# Patient Record
Sex: MALE | Race: WHITE | NOT HISPANIC OR LATINO | Employment: OTHER | URBAN - METROPOLITAN AREA
[De-identification: names, ages, dates, MRNs, and addresses within clinical notes are randomized per-mention and may not be internally consistent; named-entity substitution may affect disease eponyms.]

---

## 2023-04-25 DIAGNOSIS — I10 PRIMARY HYPERTENSION: Primary | ICD-10-CM

## 2023-04-25 RX ORDER — HYDROCHLOROTHIAZIDE 12.5 MG/1
12.5 CAPSULE ORAL DAILY
Qty: 90 CAPSULE | Refills: 3 | Status: SHIPPED | OUTPATIENT
Start: 2023-04-25 | End: 2024-04-25 | Stop reason: SDUPTHER

## 2023-04-25 RX ORDER — HYDROCHLOROTHIAZIDE 12.5 MG/1
1 CAPSULE ORAL DAILY
COMMUNITY
Start: 2013-08-25 | End: 2023-04-25 | Stop reason: SDUPTHER

## 2023-04-25 RX ORDER — HYDROCHLOROTHIAZIDE 12.5 MG/1
CAPSULE ORAL
Qty: 90 CAPSULE | Refills: 2 | Status: SHIPPED
Start: 2023-04-25 | End: 2023-09-12 | Stop reason: ALTCHOICE

## 2023-07-25 DIAGNOSIS — I10 PRIMARY HYPERTENSION: Primary | ICD-10-CM

## 2023-07-25 RX ORDER — AMLODIPINE BESYLATE 5 MG/1
TABLET ORAL
COMMUNITY
Start: 2023-06-25 | End: 2023-07-25 | Stop reason: SDUPTHER

## 2023-07-25 RX ORDER — AMLODIPINE BESYLATE 5 MG/1
TABLET ORAL
Qty: 90 TABLET | Refills: 3 | Status: SHIPPED | OUTPATIENT
Start: 2023-07-25

## 2023-09-12 ENCOUNTER — OFFICE VISIT (OUTPATIENT)
Dept: PRIMARY CARE | Facility: CLINIC | Age: 73
End: 2023-09-12
Payer: MEDICARE

## 2023-09-12 VITALS
SYSTOLIC BLOOD PRESSURE: 126 MMHG | HEART RATE: 67 BPM | WEIGHT: 198 LBS | DIASTOLIC BLOOD PRESSURE: 74 MMHG | BODY MASS INDEX: 30.01 KG/M2 | TEMPERATURE: 97.1 F | HEIGHT: 68 IN

## 2023-09-12 DIAGNOSIS — K76.0 FATTY INFILTRATION OF LIVER: ICD-10-CM

## 2023-09-12 DIAGNOSIS — E78.2 MIXED HYPERLIPIDEMIA: ICD-10-CM

## 2023-09-12 DIAGNOSIS — Z00.00 ROUTINE GENERAL MEDICAL EXAMINATION AT HEALTH CARE FACILITY: Primary | ICD-10-CM

## 2023-09-12 DIAGNOSIS — I10 ESSENTIAL HYPERTENSION: ICD-10-CM

## 2023-09-12 PROBLEM — N40.0 BENIGN PROSTATIC HYPERPLASIA WITHOUT LOWER URINARY TRACT SYMPTOMS: Status: ACTIVE | Noted: 2023-09-12

## 2023-09-12 PROBLEM — I73.00 RAYNAUD'S DISEASE: Status: ACTIVE | Noted: 2023-09-12

## 2023-09-12 PROBLEM — E78.5 HYPERLIPIDEMIA: Status: ACTIVE | Noted: 2023-09-12

## 2023-09-12 PROBLEM — L30.0 NUMMULAR DERMATITIS: Status: RESOLVED | Noted: 2019-11-26 | Resolved: 2023-09-12

## 2023-09-12 PROBLEM — Z86.0101 HX OF ADENOMATOUS COLONIC POLYPS: Status: RESOLVED | Noted: 2018-01-01 | Resolved: 2023-09-12

## 2023-09-12 PROBLEM — I87.2 CHRONIC VENOUS INSUFFICIENCY: Status: RESOLVED | Noted: 2019-11-26 | Resolved: 2023-09-12

## 2023-09-12 PROBLEM — K57.90 DIVERTICULOSIS: Status: RESOLVED | Noted: 2023-09-12 | Resolved: 2023-09-12

## 2023-09-12 PROBLEM — E55.9 VITAMIN D DEFICIENCY: Status: ACTIVE | Noted: 2023-09-12

## 2023-09-12 PROBLEM — Z86.010 HX OF ADENOMATOUS COLONIC POLYPS: Status: RESOLVED | Noted: 2018-01-01 | Resolved: 2023-09-12

## 2023-09-12 PROBLEM — H90.3 BILATERAL SENSORINEURAL HEARING LOSS: Status: RESOLVED | Noted: 2023-09-12 | Resolved: 2023-09-12

## 2023-09-12 LAB
ALANINE AMINOTRANSFERASE (SGPT) (U/L) IN SER/PLAS: 20 U/L (ref 10–52)
ALBUMIN (G/DL) IN SER/PLAS: 4.6 G/DL (ref 3.4–5)
ALKALINE PHOSPHATASE (U/L) IN SER/PLAS: 67 U/L (ref 33–136)
ANION GAP IN SER/PLAS: 15 MMOL/L (ref 10–20)
ASPARTATE AMINOTRANSFERASE (SGOT) (U/L) IN SER/PLAS: 18 U/L (ref 9–39)
BILIRUBIN TOTAL (MG/DL) IN SER/PLAS: 1.6 MG/DL (ref 0–1.2)
CALCIUM (MG/DL) IN SER/PLAS: 9.6 MG/DL (ref 8.6–10.6)
CARBON DIOXIDE, TOTAL (MMOL/L) IN SER/PLAS: 26 MMOL/L (ref 21–32)
CHLORIDE (MMOL/L) IN SER/PLAS: 103 MMOL/L (ref 98–107)
CHOLESTEROL (MG/DL) IN SER/PLAS: 108 MG/DL (ref 0–199)
CHOLESTEROL IN HDL (MG/DL) IN SER/PLAS: 44.2 MG/DL
CHOLESTEROL/HDL RATIO: 2.4
CREAT UR STRIP-MCNC: 100 MG/DL
CREATININE (MG/DL) IN SER/PLAS: 0.99 MG/DL (ref 0.5–1.3)
ERYTHROCYTE DISTRIBUTION WIDTH (RATIO) BY AUTOMATED COUNT: 13.2 % (ref 11.5–14.5)
ERYTHROCYTE MEAN CORPUSCULAR HEMOGLOBIN CONCENTRATION (G/DL) BY AUTOMATED: 33 G/DL (ref 32–36)
ERYTHROCYTE MEAN CORPUSCULAR VOLUME (FL) BY AUTOMATED COUNT: 89 FL (ref 80–100)
ERYTHROCYTES (10*6/UL) IN BLOOD BY AUTOMATED COUNT: 5.57 X10E12/L (ref 4.5–5.9)
GFR MALE: 80 ML/MIN/1.73M2
GLUCOSE (MG/DL) IN SER/PLAS: 92 MG/DL (ref 74–99)
HEMATOCRIT (%) IN BLOOD BY AUTOMATED COUNT: 49.7 % (ref 41–52)
HEMOGLOBIN (G/DL) IN BLOOD: 16.4 G/DL (ref 13.5–17.5)
LDL: 48 MG/DL (ref 0–99)
LEUKOCYTES (10*3/UL) IN BLOOD BY AUTOMATED COUNT: 6 X10E9/L (ref 4.4–11.3)
MICROALBUMIN UR TEST STR-MCNC: 10 MG/L
NRBC (PER 100 WBCS) BY AUTOMATED COUNT: 0 /100 WBC (ref 0–0)
PLATELETS (10*3/UL) IN BLOOD AUTOMATED COUNT: 179 X10E9/L (ref 150–450)
POTASSIUM (MMOL/L) IN SER/PLAS: 3.9 MMOL/L (ref 3.5–5.3)
PROT/CREAT UR: <30 UG/MG CREAT
PROTEIN TOTAL: 7.1 G/DL (ref 6.4–8.2)
SODIUM (MMOL/L) IN SER/PLAS: 140 MMOL/L (ref 136–145)
TRIGLYCERIDE (MG/DL) IN SER/PLAS: 79 MG/DL (ref 0–149)
UREA NITROGEN (MG/DL) IN SER/PLAS: 16 MG/DL (ref 6–23)
VLDL: 16 MG/DL (ref 0–40)

## 2023-09-12 PROCEDURE — 1036F TOBACCO NON-USER: CPT | Performed by: INTERNAL MEDICINE

## 2023-09-12 PROCEDURE — 82043 UR ALBUMIN QUANTITATIVE: CPT | Mod: CLIA WAIVED TEST | Performed by: INTERNAL MEDICINE

## 2023-09-12 PROCEDURE — 3074F SYST BP LT 130 MM HG: CPT | Performed by: INTERNAL MEDICINE

## 2023-09-12 PROCEDURE — 82570 ASSAY OF URINE CREATININE: CPT | Mod: CLIA WAIVED TEST | Performed by: INTERNAL MEDICINE

## 2023-09-12 PROCEDURE — 80061 LIPID PANEL: CPT

## 2023-09-12 PROCEDURE — 1160F RVW MEDS BY RX/DR IN RCRD: CPT | Performed by: INTERNAL MEDICINE

## 2023-09-12 PROCEDURE — 1159F MED LIST DOCD IN RCRD: CPT | Performed by: INTERNAL MEDICINE

## 2023-09-12 PROCEDURE — G0439 PPPS, SUBSEQ VISIT: HCPCS | Performed by: INTERNAL MEDICINE

## 2023-09-12 PROCEDURE — 99214 OFFICE O/P EST MOD 30 MIN: CPT | Performed by: INTERNAL MEDICINE

## 2023-09-12 PROCEDURE — 3078F DIAST BP <80 MM HG: CPT | Performed by: INTERNAL MEDICINE

## 2023-09-12 PROCEDURE — 85027 COMPLETE CBC AUTOMATED: CPT

## 2023-09-12 PROCEDURE — G0444 DEPRESSION SCREEN ANNUAL: HCPCS | Performed by: INTERNAL MEDICINE

## 2023-09-12 PROCEDURE — 80053 COMPREHEN METABOLIC PANEL: CPT

## 2023-09-12 PROCEDURE — 1170F FXNL STATUS ASSESSED: CPT | Performed by: INTERNAL MEDICINE

## 2023-09-12 RX ORDER — GLUCOSAMINE/CHONDR SU A SOD 750-600 MG
TABLET ORAL
COMMUNITY

## 2023-09-12 RX ORDER — ATORVASTATIN CALCIUM 10 MG/1
1 TABLET, FILM COATED ORAL DAILY
COMMUNITY
Start: 2013-07-25 | End: 2024-01-22 | Stop reason: SDUPTHER

## 2023-09-12 RX ORDER — ACETAMINOPHEN 500 MG
1 TABLET ORAL DAILY
COMMUNITY
Start: 2013-08-11

## 2023-09-12 RX ORDER — NAPROXEN SODIUM 220 MG/1
TABLET ORAL
COMMUNITY

## 2023-09-12 ASSESSMENT — ACTIVITIES OF DAILY LIVING (ADL)
MANAGING_FINANCES: INDEPENDENT
TAKING_MEDICATION: INDEPENDENT
DOING_HOUSEWORK: INDEPENDENT
GROCERY_SHOPPING: INDEPENDENT
DRESSING: INDEPENDENT
BATHING: INDEPENDENT

## 2023-09-12 ASSESSMENT — PATIENT HEALTH QUESTIONNAIRE - PHQ9
1. LITTLE INTEREST OR PLEASURE IN DOING THINGS: NOT AT ALL
2. FEELING DOWN, DEPRESSED OR HOPELESS: NOT AT ALL
SUM OF ALL RESPONSES TO PHQ9 QUESTIONS 1 AND 2: 0

## 2023-09-12 NOTE — PROGRESS NOTES
"Subjective   Wicho Alexis is a 73 y.o. male who presents for No chief complaint on file..  HPI    Interim:  - concerned about weight gain, about 8lbs    No CP or SOB.    Some sinus congestion with PND.     Occasional nocturia or incomplete void, not troubling, no changes.   Not sexually active.     , non-smoker, no EtOH use, well-balanced diet.  Walking at least a mile daily.   Does some part-time work on the side, mostly repair work.     Sees a chiropractor, managing back pain/arthritis.   Has seen Dr. Nicole in the past for rash/itching.             Objective   /82   Pulse 67   Temp 36.2 °C (97.1 °F)   Ht 1.717 m (5' 7.58\")   Wt 89.8 kg (198 lb)   BMI 30.48 kg/m²    Physical Exam  Gen: NAD, pleasant, A&;Ox3  HEENT: PERRL, EOMI, MMM, OP clear  Neck: supple, no thyromegaly, no JVD, normal carotid upstroke  Pulm: lungs CTAB, good air movement  CV: RRR, no m/r/g, 2+ DP pulses on the left, 1+ on the right  Abd: NABS, soft, NT, ND no HSM  Ext: no peripheral edema, some swelling and CVI changes  Neuro: CN II-XII intact, no focal sensory or motor deficits, normal reflexes  Assessment/Plan     Hypertension, usually good control; continue home monitoring  -Continue amlodipine 5 and HCTZ 12.5  - On statin for primary prevention, off ASA as per ASPREE; LDL optimized  - Recheck basic labs     Health maintenance  -Last colonoscopy: 2/2/2023, mild diverticulosis and internal hemorrhoids; repeat 5 years  - PSA 0.8, SEP 2019, mild BPH sx's  -Smoking history: Never  -Counseled regarding diet and exercise  -Immunizations: annual flu, otherwise UTD  -Followup annually for Medicare wellness visits and as needed  Problem List Items Addressed This Visit    None           Scot Mistry MD   "

## 2024-01-22 DIAGNOSIS — E78.2 MIXED HYPERLIPIDEMIA: Primary | ICD-10-CM

## 2024-01-22 RX ORDER — ATORVASTATIN CALCIUM 10 MG/1
10 TABLET, FILM COATED ORAL DAILY
Qty: 90 TABLET | Refills: 3 | Status: SHIPPED | OUTPATIENT
Start: 2024-01-22

## 2024-04-23 DIAGNOSIS — I10 PRIMARY HYPERTENSION: ICD-10-CM

## 2024-04-24 DIAGNOSIS — I10 PRIMARY HYPERTENSION: ICD-10-CM

## 2024-04-25 RX ORDER — HYDROCHLOROTHIAZIDE 12.5 MG/1
12.5 CAPSULE ORAL DAILY
Qty: 90 CAPSULE | Refills: 3 | Status: SHIPPED | OUTPATIENT
Start: 2024-04-25

## 2024-07-02 ASSESSMENT — ENCOUNTER SYMPTOMS
COUGH: 1
FEVER: 1

## 2024-07-03 ENCOUNTER — HOSPITAL ENCOUNTER (OUTPATIENT)
Dept: RADIOLOGY | Facility: CLINIC | Age: 74
Discharge: HOME | End: 2024-07-03
Payer: MEDICARE

## 2024-07-03 ENCOUNTER — OFFICE VISIT (OUTPATIENT)
Dept: PRIMARY CARE | Facility: CLINIC | Age: 74
End: 2024-07-03
Payer: MEDICARE

## 2024-07-03 VITALS
WEIGHT: 198 LBS | DIASTOLIC BLOOD PRESSURE: 75 MMHG | OXYGEN SATURATION: 93 % | SYSTOLIC BLOOD PRESSURE: 116 MMHG | BODY MASS INDEX: 31.08 KG/M2 | HEART RATE: 80 BPM | HEIGHT: 67 IN | TEMPERATURE: 99.9 F

## 2024-07-03 DIAGNOSIS — R50.9 FEVER, UNSPECIFIED FEVER CAUSE: ICD-10-CM

## 2024-07-03 DIAGNOSIS — R30.0 DYSURIA: Primary | ICD-10-CM

## 2024-07-03 DIAGNOSIS — J18.9 COMMUNITY ACQUIRED PNEUMONIA OF LEFT LOWER LOBE OF LUNG: ICD-10-CM

## 2024-07-03 DIAGNOSIS — J45.991 COUGH VARIANT ASTHMA (HHS-HCC): ICD-10-CM

## 2024-07-03 LAB
APPEARANCE UR: CLEAR
BILIRUB UR QL STRIP: NEGATIVE
COLOR UR: ABNORMAL
GLUCOSE UR STRIP-MCNC: NEGATIVE MG/DL
HGB UR QL STRIP: ABNORMAL
KETONES UR STRIP-MCNC: ABNORMAL MG/DL
LEUKOCYTE ESTERASE UR QL STRIP: NEGATIVE
NITRITE UR QL STRIP: NEGATIVE
PH UR STRIP: 6 [PH]
PROT UR STRIP-MCNC: ABNORMAL MG/DL
SP GR UR STRIP.AUTO: 1.02
UROBILINOGEN UR STRIP-ACNC: 0.2 E.U./DL

## 2024-07-03 PROCEDURE — 1123F ACP DISCUSS/DSCN MKR DOCD: CPT | Performed by: INTERNAL MEDICINE

## 2024-07-03 PROCEDURE — 71046 X-RAY EXAM CHEST 2 VIEWS: CPT | Performed by: RADIOLOGY

## 2024-07-03 PROCEDURE — 99214 OFFICE O/P EST MOD 30 MIN: CPT | Performed by: INTERNAL MEDICINE

## 2024-07-03 PROCEDURE — 1159F MED LIST DOCD IN RCRD: CPT | Performed by: INTERNAL MEDICINE

## 2024-07-03 PROCEDURE — 87632 RESP VIRUS 6-11 TARGETS: CPT

## 2024-07-03 PROCEDURE — 81003 URINALYSIS AUTO W/O SCOPE: CPT | Performed by: INTERNAL MEDICINE

## 2024-07-03 PROCEDURE — 1036F TOBACCO NON-USER: CPT | Performed by: INTERNAL MEDICINE

## 2024-07-03 PROCEDURE — 3074F SYST BP LT 130 MM HG: CPT | Performed by: INTERNAL MEDICINE

## 2024-07-03 PROCEDURE — 71046 X-RAY EXAM CHEST 2 VIEWS: CPT

## 2024-07-03 PROCEDURE — 3078F DIAST BP <80 MM HG: CPT | Performed by: INTERNAL MEDICINE

## 2024-07-03 RX ORDER — LEVOFLOXACIN 750 MG/1
750 TABLET ORAL DAILY
Qty: 5 TABLET | Refills: 0 | Status: SHIPPED | OUTPATIENT
Start: 2024-07-03 | End: 2024-07-08

## 2024-07-03 ASSESSMENT — COLUMBIA-SUICIDE SEVERITY RATING SCALE - C-SSRS
6. HAVE YOU EVER DONE ANYTHING, STARTED TO DO ANYTHING, OR PREPARED TO DO ANYTHING TO END YOUR LIFE?: NO
2. HAVE YOU ACTUALLY HAD ANY THOUGHTS OF KILLING YOURSELF?: NO
1. IN THE PAST MONTH, HAVE YOU WISHED YOU WERE DEAD OR WISHED YOU COULD GO TO SLEEP AND NOT WAKE UP?: NO

## 2024-07-03 ASSESSMENT — ENCOUNTER SYMPTOMS
LOSS OF SENSATION IN FEET: 0
OCCASIONAL FEELINGS OF UNSTEADINESS: 0
DEPRESSION: 0

## 2024-07-03 ASSESSMENT — PATIENT HEALTH QUESTIONNAIRE - PHQ9
SUM OF ALL RESPONSES TO PHQ9 QUESTIONS 1 AND 2: 0
2. FEELING DOWN, DEPRESSED OR HOPELESS: NOT AT ALL
1. LITTLE INTEREST OR PLEASURE IN DOING THINGS: NOT AT ALL

## 2024-07-03 NOTE — PROGRESS NOTES
Subjective   Patient ID: Wicho Alexis is a 74 y.o. male who presents for Sick Visit (Had fever off and on since Sunday, cough, sinus draining, has taken 2 Covid test last one being this morning, feels like it might be a uti, going to get a urin and test for uti ).    HPI   Started feeling feverish Monday. He took his temp at home due to intermittent fevers with tmax 101F but unsure if his thermometer is accurate. Took COVID test Monday night and today morning which were negative. Diarrhea Sunday night, took immodium and sx resolved. States he has been occasionally coughing more than usual (nonproductive) when laying down. No URTI sx. Has been feeling more tired over the last few days. Endorsing slight burning during urination and thinks he may have a UTI. Has not taken any medications for fever. Denies sick contacts. Endorses headaches, chills. Denies chest pain, shortness of breath, abdominal pain, nausea, vomiting.     Objective   Vitals:    07/03/24 1539   BP: 116/75   Pulse: 80   Temp: 37.7 °C (99.9 °F)   SpO2: 93%        Physical Exam  General: NAD, appears comfortable  HEENT: NCAT, no scleral icterus, EOMI, clear OP  Heart: RRR, no m/r/g  Lungs: Crackles in LLL, normal WOB  Abdomen: soft, NT, ND, +BS, no suprapubic tenderness  Extremities; trace bl LE edema  Neuro: Aox4, NFND    Assessment/Plan     74 y.o. male who presents for sick visit. Intermittent fevers since 7/1 with tmax 101. SpO2 92-93% in clinic with crackles in LLL. Home COVID test neg x2. Clinical findings concerning for pneumonia. Will obtain respiratory viral panel and stat CXR. Patient also having urinary burning- will obtain UA.

## 2024-07-08 LAB
ADENOVIRUS RVP, VIRC: NOT DETECTED
ENTEROVIRUS/RHINOVIRUS RVP, VIRC: NOT DETECTED
HUMAN BOCAVIRUS RVP, VIRC: NOT DETECTED
HUMAN CORONAVIRUS RVP, VIRC: NOT DETECTED
INFLUENZA A , VIRC: NOT DETECTED
INFLUENZA A H1N1-09 , VIRC: NOT DETECTED
INFLUENZA B PCR, VIRC: NOT DETECTED
METAPNEUMOVIRUS , VIRC: NOT DETECTED
PARAINFLUENZA PCR, VIRC: NOT DETECTED
RSV PCR, RVP, VIRC: NOT DETECTED

## 2024-07-23 DIAGNOSIS — I10 PRIMARY HYPERTENSION: ICD-10-CM

## 2024-07-23 RX ORDER — AMLODIPINE BESYLATE 5 MG/1
TABLET ORAL
Qty: 90 TABLET | Refills: 3 | Status: SHIPPED | OUTPATIENT
Start: 2024-07-23

## 2024-09-11 ENCOUNTER — APPOINTMENT (OUTPATIENT)
Dept: PRIMARY CARE | Facility: CLINIC | Age: 74
End: 2024-09-11
Payer: MEDICARE

## 2024-09-11 ENCOUNTER — HOSPITAL ENCOUNTER (OUTPATIENT)
Dept: RADIOLOGY | Facility: CLINIC | Age: 74
Discharge: HOME | End: 2024-09-11
Payer: MEDICARE

## 2024-09-11 ENCOUNTER — LAB (OUTPATIENT)
Dept: LAB | Facility: LAB | Age: 74
End: 2024-09-11
Payer: MEDICARE

## 2024-09-11 VITALS
BODY MASS INDEX: 31.66 KG/M2 | TEMPERATURE: 97.3 F | DIASTOLIC BLOOD PRESSURE: 69 MMHG | WEIGHT: 201.7 LBS | SYSTOLIC BLOOD PRESSURE: 139 MMHG | HEART RATE: 73 BPM | HEIGHT: 67 IN

## 2024-09-11 DIAGNOSIS — E78.2 MIXED HYPERLIPIDEMIA: ICD-10-CM

## 2024-09-11 DIAGNOSIS — I10 ESSENTIAL HYPERTENSION: ICD-10-CM

## 2024-09-11 DIAGNOSIS — Z00.00 ROUTINE GENERAL MEDICAL EXAMINATION AT HEALTH CARE FACILITY: Primary | ICD-10-CM

## 2024-09-11 DIAGNOSIS — R05.3 PERSISTENT COUGH: ICD-10-CM

## 2024-09-11 DIAGNOSIS — E66.09 CLASS 1 OBESITY DUE TO EXCESS CALORIES WITHOUT SERIOUS COMORBIDITY WITH BODY MASS INDEX (BMI) OF 31.0 TO 31.9 IN ADULT: ICD-10-CM

## 2024-09-11 DIAGNOSIS — Z00.00 ROUTINE GENERAL MEDICAL EXAMINATION AT HEALTH CARE FACILITY: ICD-10-CM

## 2024-09-11 DIAGNOSIS — H91.93 BILATERAL HEARING LOSS, UNSPECIFIED HEARING LOSS TYPE: ICD-10-CM

## 2024-09-11 LAB
ALBUMIN SERPL BCP-MCNC: 4.6 G/DL (ref 3.4–5)
ALP SERPL-CCNC: 65 U/L (ref 33–136)
ALT SERPL W P-5'-P-CCNC: 21 U/L (ref 10–52)
ANION GAP SERPL CALC-SCNC: 14 MMOL/L (ref 10–20)
APPEARANCE UR: CLEAR
AST SERPL W P-5'-P-CCNC: 16 U/L (ref 9–39)
BASOPHILS # BLD AUTO: 0.04 X10*3/UL (ref 0–0.1)
BASOPHILS NFR BLD AUTO: 0.6 %
BILIRUB SERPL-MCNC: 1.4 MG/DL (ref 0–1.2)
BILIRUB UR QL STRIP: NEGATIVE
BUN SERPL-MCNC: 23 MG/DL (ref 6–23)
CALCIUM SERPL-MCNC: 9.6 MG/DL (ref 8.6–10.6)
CHLORIDE SERPL-SCNC: 103 MMOL/L (ref 98–107)
CHOLEST SERPL-MCNC: 128 MG/DL (ref 0–199)
CHOLESTEROL/HDL RATIO: 3
CO2 SERPL-SCNC: 25 MMOL/L (ref 21–32)
COLOR UR: YELLOW
CREAT SERPL-MCNC: 1 MG/DL (ref 0.5–1.3)
CREAT UR STRIP-MCNC: 100 MG/DL
EGFRCR SERPLBLD CKD-EPI 2021: 78 ML/MIN/1.73M*2
EOSINOPHIL # BLD AUTO: 0.09 X10*3/UL (ref 0–0.4)
EOSINOPHIL NFR BLD AUTO: 1.3 %
ERYTHROCYTE [DISTWIDTH] IN BLOOD BY AUTOMATED COUNT: 13.4 % (ref 11.5–14.5)
GLUCOSE SERPL-MCNC: 110 MG/DL (ref 74–99)
GLUCOSE UR STRIP-MCNC: NEGATIVE MG/DL
HCT VFR BLD AUTO: 47.3 % (ref 41–52)
HDLC SERPL-MCNC: 43.3 MG/DL
HGB BLD-MCNC: 15.9 G/DL (ref 13.5–17.5)
HGB UR QL STRIP: ABNORMAL
IMM GRANULOCYTES # BLD AUTO: 0.02 X10*3/UL (ref 0–0.5)
IMM GRANULOCYTES NFR BLD AUTO: 0.3 % (ref 0–0.9)
KETONES UR STRIP-MCNC: NEGATIVE MG/DL
LDLC SERPL CALC-MCNC: 68 MG/DL
LEUKOCYTE ESTERASE UR QL STRIP: ABNORMAL
LYMPHOCYTES # BLD AUTO: 1.79 X10*3/UL (ref 0.8–3)
LYMPHOCYTES NFR BLD AUTO: 25.9 %
MCH RBC QN AUTO: 29.2 PG (ref 26–34)
MCHC RBC AUTO-ENTMCNC: 33.6 G/DL (ref 32–36)
MCV RBC AUTO: 87 FL (ref 80–100)
MICROALBUMIN UR TEST STR-MCNC: 30 MG/L
MONOCYTES # BLD AUTO: 0.61 X10*3/UL (ref 0.05–0.8)
MONOCYTES NFR BLD AUTO: 8.8 %
NEUTROPHILS # BLD AUTO: 4.37 X10*3/UL (ref 1.6–5.5)
NEUTROPHILS NFR BLD AUTO: 63.1 %
NITRITE UR QL STRIP: NEGATIVE
NON HDL CHOLESTEROL: 85 MG/DL (ref 0–149)
NRBC BLD-RTO: 0 /100 WBCS (ref 0–0)
PH UR STRIP: 6 [PH]
PLATELET # BLD AUTO: 185 X10*3/UL (ref 150–450)
POTASSIUM SERPL-SCNC: 4 MMOL/L (ref 3.5–5.3)
PROT SERPL-MCNC: 7.4 G/DL (ref 6.4–8.2)
PROT UR STRIP-MCNC: NEGATIVE MG/DL
PROT/CREAT UR: <30 UG/MG CREAT
RBC # BLD AUTO: 5.45 X10*6/UL (ref 4.5–5.9)
SODIUM SERPL-SCNC: 138 MMOL/L (ref 136–145)
SP GR UR STRIP.AUTO: 1.02
TRIGL SERPL-MCNC: 86 MG/DL (ref 0–149)
UROBILINOGEN UR STRIP-ACNC: 0.2 E.U./DL
VLDL: 17 MG/DL (ref 0–40)
WBC # BLD AUTO: 6.9 X10*3/UL (ref 4.4–11.3)

## 2024-09-11 PROCEDURE — 3075F SYST BP GE 130 - 139MM HG: CPT | Performed by: INTERNAL MEDICINE

## 2024-09-11 PROCEDURE — 71046 X-RAY EXAM CHEST 2 VIEWS: CPT

## 2024-09-11 PROCEDURE — 82570 ASSAY OF URINE CREATININE: CPT | Mod: CLIA WAIVED TEST | Performed by: INTERNAL MEDICINE

## 2024-09-11 PROCEDURE — G0447 BEHAVIOR COUNSEL OBESITY 15M: HCPCS | Performed by: INTERNAL MEDICINE

## 2024-09-11 PROCEDURE — 71046 X-RAY EXAM CHEST 2 VIEWS: CPT | Performed by: RADIOLOGY

## 2024-09-11 PROCEDURE — 82043 UR ALBUMIN QUANTITATIVE: CPT | Mod: CLIA WAIVED TEST | Performed by: INTERNAL MEDICINE

## 2024-09-11 PROCEDURE — G0444 DEPRESSION SCREEN ANNUAL: HCPCS | Performed by: INTERNAL MEDICINE

## 2024-09-11 PROCEDURE — 3078F DIAST BP <80 MM HG: CPT | Performed by: INTERNAL MEDICINE

## 2024-09-11 PROCEDURE — 1123F ACP DISCUSS/DSCN MKR DOCD: CPT | Performed by: INTERNAL MEDICINE

## 2024-09-11 PROCEDURE — G0439 PPPS, SUBSEQ VISIT: HCPCS | Performed by: INTERNAL MEDICINE

## 2024-09-11 PROCEDURE — G0446 INTENS BEHAVE THER CARDIO DX: HCPCS | Performed by: INTERNAL MEDICINE

## 2024-09-11 PROCEDURE — 99214 OFFICE O/P EST MOD 30 MIN: CPT | Performed by: INTERNAL MEDICINE

## 2024-09-11 PROCEDURE — 1159F MED LIST DOCD IN RCRD: CPT | Performed by: INTERNAL MEDICINE

## 2024-09-11 PROCEDURE — 81003 URINALYSIS AUTO W/O SCOPE: CPT | Performed by: INTERNAL MEDICINE

## 2024-09-11 PROCEDURE — 80061 LIPID PANEL: CPT

## 2024-09-11 PROCEDURE — 1160F RVW MEDS BY RX/DR IN RCRD: CPT | Performed by: INTERNAL MEDICINE

## 2024-09-11 PROCEDURE — 1126F AMNT PAIN NOTED NONE PRSNT: CPT | Performed by: INTERNAL MEDICINE

## 2024-09-11 PROCEDURE — 36415 COLL VENOUS BLD VENIPUNCTURE: CPT

## 2024-09-11 PROCEDURE — 1170F FXNL STATUS ASSESSED: CPT | Performed by: INTERNAL MEDICINE

## 2024-09-11 PROCEDURE — 80053 COMPREHEN METABOLIC PANEL: CPT

## 2024-09-11 PROCEDURE — 1036F TOBACCO NON-USER: CPT | Performed by: INTERNAL MEDICINE

## 2024-09-11 PROCEDURE — 85025 COMPLETE CBC W/AUTO DIFF WBC: CPT

## 2024-09-11 ASSESSMENT — ACTIVITIES OF DAILY LIVING (ADL)
DOING_HOUSEWORK: INDEPENDENT
TAKING_MEDICATION: INDEPENDENT
BATHING: INDEPENDENT
MANAGING_FINANCES: INDEPENDENT
GROCERY_SHOPPING: INDEPENDENT
DRESSING: INDEPENDENT

## 2024-09-11 ASSESSMENT — PATIENT HEALTH QUESTIONNAIRE - PHQ9
2. FEELING DOWN, DEPRESSED OR HOPELESS: NOT AT ALL
1. LITTLE INTEREST OR PLEASURE IN DOING THINGS: NOT AT ALL
SUM OF ALL RESPONSES TO PHQ9 QUESTIONS 1 AND 2: 0

## 2024-09-11 ASSESSMENT — PAIN SCALES - GENERAL: PAINLEVEL: 0-NO PAIN

## 2024-09-11 NOTE — PROGRESS NOTES
"Subjective   Wicho Alexis is a 74 y.o. male who presents for Medicare Annual Wellness Visit Subsequent.  HPI    Interim:  - seen for CAP in July; recovered but has persistent increased phlegm and cough, no SOB.  No fevers, chills, sweats or weight changes.  Occasional Mucinex, helps some.  Some sinus congestion with PND.    No CP or SOB.       Occasional nocturia or incomplete void, not troubling, no changes.   Not sexually active.     , non-smoker, no EtOH use, well-balanced diet.  Walking at least a mile daily.   Does some part-time work on the side, mostly repair work.     Sees a chiropractor, managing back pain/arthritis.   Has seen Dr. Nicole in the past for rash/itching.  Dr. Dora Deras -- ophtho             Objective   /69 (BP Location: Left arm, Patient Position: Sitting, BP Cuff Size: Adult)   Pulse 73   Temp 36.3 °C (97.3 °F) (Oral)   Ht 1.702 m (5' 7\")   Wt 91.5 kg (201 lb 11.2 oz)   BMI 31.59 kg/m²    Physical Exam  Gen: NAD, pleasant, A&;Ox3  HEENT: PERRL, EOMI, MMM, OP clear  Neck: supple, no thyromegaly, no JVD, normal carotid upstroke  Pulm: lungs CTAB, good air movement  CV: RRR, no m/r/g, 2+ DP pulses on the left, 1+ on the right  Abd: NABS, soft, NT, ND no HSM  Ext: no peripheral edema, some swelling and CVI changes  Neuro: CN II-XII intact, no focal sensory or motor deficits, normal reflexes  Assessment/Plan     Persistent cough since CAP:    - repeat CXR  - Mucinex, continue Claritin    Hypertension, usually good control; continue home monitoring  -Continue amlodipine 5 and HCTZ 12.5  - On statin for primary prevention, off ASA as per ASPREE; LDL optimized  - Recheck basic labs     Health maintenance  -Last colonoscopy: 2/2/2023, mild diverticulosis and internal hemorrhoids; repeat 5 years  - PSA 1.08, SEP 2022, no further screening needed, mild BPH sx's  -Smoking history: Never  -Counseled regarding diet and exercise  -Immunizations: annual flu, recommend RSV and COVID " Patient did not return our call.  Prescription denied.     booster  -Followup annually for Medicare wellness visits and as needed  Problem List Items Addressed This Visit    None    5-10 minutes were spent on screening for Depression.  The ASCVD Risk score (Juliana DK, et al., 2019) failed to calculate for the following reasons:    The valid total cholesterol range is 130 to 320 mg/dL  Cardiovascular risk discussed and modifiable risk factors addressed.  Discussion included options of pharmaceutical interventions and recommended lifestyle modifications, including nutritional choices, exercise, and elimination of habits contributing to risk.   >15 minutes spent on assessment and discussion.  BMI was above normal measurement. Current weight: 91.5 kg (201 lb 11.2 oz)  Weight change since last visit (-) denotes wt loss 3.7 lbs   Weight loss needed to achieve BMI 25: 42.4 Lbs  Weight loss needed to achieve BMI 30: 10.6 Lbs  Provided instructions on dietary changes  Advised to Increase physical activity.  >15 minutes spent counseling         Scot Mistry MD

## 2024-10-04 ENCOUNTER — CLINICAL SUPPORT (OUTPATIENT)
Dept: AUDIOLOGY | Facility: CLINIC | Age: 74
End: 2024-10-04
Payer: MEDICARE

## 2024-10-04 DIAGNOSIS — H90.3 ASYMMETRIC SNHL (SENSORINEURAL HEARING LOSS): Primary | ICD-10-CM

## 2024-10-04 PROCEDURE — 92557 COMPREHENSIVE HEARING TEST: CPT | Performed by: AUDIOLOGIST

## 2024-10-04 PROCEDURE — 92550 TYMPANOMETRY & REFLEX THRESH: CPT | Performed by: AUDIOLOGIST

## 2024-10-04 NOTE — PROGRESS NOTES
"AUDIOLOGY ADULT AUDIOMETRIC EVALUATION      Name:  Wicho Alexis  :  1950  Age:  74 y.o.  Date of Evaluation:  10/4/2024    HISTORY  Reason for visit:  hearing loss  Mr. Alexis is seen 10/4/2024 at the request of  Scot Mistry MD for an evaluation of hearing.      Chief complaint:    Hearing seems to have worsened since his previous audiogram of 3/13/2018    Hearing loss:  (left worse than right sensorineural hearing loss   Tinnitus:   not usually bothersome  Otitis Media: denies  Otologic surgical history:  denies  Dizziness/imbalance:  denies  Otalgia:  denies  Ear pressure/fullness:  denies  History of excessive noise exposure:  yes, occupational  Other: none    Hearing aid history: none         EVALUATION  Please find audiogram in \"Media\" tab (Document Type:  Audiology Report) or included at the bottom of this note.    RESULTS   Otoscopic Evaluation: clear canals bilaterally      Immittance Measures (226 Hz probe tone):   Tympanometry is consistent with normal middle ear pressure and normal tympanic membrane mobility bilaterally.       Ipsilateral acoustic reflexes (500-4000 Hz) are present for 500-2000 Hz bilaterally.    Test technique:  standard behavioral technique via insert earphones.  Reliability is good.    Pure Tone Audiometry:  Hearing sensitivity is in the normal hearing to severe hearing loss range bilaterally.       Speech Audiometry:        Right Ear:  Speech Reception Threshold (SRT) was obtained at 35 dBHL                 Speech discrimination score was 100% in quiet when words were presented at 85 dBHL      Left Ear:  Speech Reception Threshold (SRT) was obtained at 45 dBHL                 Speech discrimination score was 88% in quiet when words were presented at 105 dBHL    Note speech discrimination score asymmetry (left worse than right)     IMPRESSIONS:  In comparison with previous audiogram of 3/13/2018, there has been worsening of hearing bilaterally for 6347-3196 Hz as well as " worsening of left speech discrimination score.      Patient is expected to experience communication difficulty in all listening situations.   Patient is expected to benefit from devices that provide amplification (e.g., hearing aids) and improve the desired sound signal over that of background noise as well as from effective communication strategies.      RECOMMENDATIONS  Continue with medical follow-up with referring provider.     Hearing Aid Evaluation with an audiologist to discuss hearing technology (such as hearing aids) and services.  Reassess hearing in 1 year (or sooner if medically indicated or if there is a concern for a change in hearing).    Continue with medical follow-up as indicated.       PATIENT EDUCATION  Discussed results and recommendations with patient.  Questions were addressed and the patient was encouraged to contact our department should concerns arise.       TEREZA Buitrago, CCC-A  Licensed Audiologist

## 2024-10-24 DIAGNOSIS — I10 PRIMARY HYPERTENSION: ICD-10-CM

## 2024-10-26 RX ORDER — HYDROCHLOROTHIAZIDE 12.5 MG/1
12.5 CAPSULE ORAL DAILY
Qty: 30 CAPSULE | Refills: 11 | Status: SHIPPED | OUTPATIENT
Start: 2024-10-26 | End: 2025-10-26

## 2024-11-01 ENCOUNTER — APPOINTMENT (OUTPATIENT)
Dept: AUDIOLOGY | Facility: CLINIC | Age: 74
End: 2024-11-01
Payer: MEDICARE

## 2025-04-02 ENCOUNTER — TELEPHONE (OUTPATIENT)
Dept: PRIMARY CARE | Facility: CLINIC | Age: 75
End: 2025-04-02
Payer: MEDICARE

## 2025-04-02 NOTE — TELEPHONE ENCOUNTER
This patient is having a Left hip replacement on April 18 th. He would like for you to give him a call to discuss some information before the procedure.

## 2025-04-08 LAB
NON-UH HIE APPEARANCE, U: NORMAL
NON-UH HIE BILIRUBIN, U: NORMAL
NON-UH HIE BLOOD, U: NORMAL
NON-UH HIE BUN/CREAT RATIO: 16.4
NON-UH HIE BUN: 18 MG/DL (ref 9–23)
NON-UH HIE CALCIUM: 9.9 MG/DL (ref 8.7–10.4)
NON-UH HIE CALCULATED OSMOLALITY: 284 MOSM/KG (ref 275–295)
NON-UH HIE CHLORIDE: 103 MMOL/L (ref 98–107)
NON-UH HIE CO2, VENOUS: 28 MMOL/L (ref 20–31)
NON-UH HIE COLOR, U: NORMAL
NON-UH HIE CREATININE: 1.1 MG/DL (ref 0.6–1.1)
NON-UH HIE GFR AA: >60
NON-UH HIE GLOMERULAR FILTRATION RATE: >60 ML/MIN/1.73M?
NON-UH HIE GLUCOSE QUAL, U: NORMAL
NON-UH HIE GLUCOSE: 141 MG/DL (ref 74–106)
NON-UH HIE HCT: 49 % (ref 41–52)
NON-UH HIE HGB: 16.7 G/DL (ref 13.5–17.5)
NON-UH HIE INSTR WBC ND: 7.3
NON-UH HIE K: 3.7 MMOL/L (ref 3.5–5.1)
NON-UH HIE KETONES, U: NORMAL
NON-UH HIE LEUKOCYTE ESTERASE, U: NORMAL
NON-UH HIE MCH: 30.5 PG (ref 27–34)
NON-UH HIE MCHC: 34 G/DL (ref 32–37)
NON-UH HIE MCV: 89.8 FL (ref 80–100)
NON-UH HIE MPV: 7.5 FL (ref 7.4–10.4)
NON-UH HIE NA: 140 MMOL/L (ref 135–145)
NON-UH HIE NITRITE, U: NORMAL
NON-UH HIE PH, U: 6 (ref 4.5–8)
NON-UH HIE PLATELET: 209 X10 (ref 150–450)
NON-UH HIE PROTEIN, U: NORMAL
NON-UH HIE RBC: 5.46 X10 (ref 4.7–6.1)
NON-UH HIE RDW: 13.7 % (ref 11.5–14.5)
NON-UH HIE SPECIFIC GRAVITY, U: 1.01 (ref 1–1.03)
NON-UH HIE U MICRO: NORMAL
NON-UH HIE UROBILINOGEN QUAL, U: NORMAL
NON-UH HIE WBC: 7.3 X10 (ref 4.5–11)

## 2025-07-24 DIAGNOSIS — I10 PRIMARY HYPERTENSION: ICD-10-CM

## 2025-07-24 RX ORDER — AMLODIPINE BESYLATE 5 MG/1
TABLET ORAL
Qty: 90 TABLET | Refills: 3 | Status: SHIPPED | OUTPATIENT
Start: 2025-07-24

## 2025-09-10 ENCOUNTER — APPOINTMENT (OUTPATIENT)
Dept: PRIMARY CARE | Facility: CLINIC | Age: 75
End: 2025-09-10
Payer: MEDICARE